# Patient Record
Sex: FEMALE | Race: WHITE | NOT HISPANIC OR LATINO
[De-identification: names, ages, dates, MRNs, and addresses within clinical notes are randomized per-mention and may not be internally consistent; named-entity substitution may affect disease eponyms.]

---

## 2021-02-05 ENCOUNTER — DASHBOARD ENCOUNTERS (OUTPATIENT)
Age: 61
End: 2021-02-05

## 2021-02-11 ENCOUNTER — OFFICE VISIT (OUTPATIENT)
Dept: URBAN - METROPOLITAN AREA CLINIC 40 | Facility: CLINIC | Age: 61
End: 2021-02-11

## 2022-09-13 ENCOUNTER — APPOINTMENT (RX ONLY)
Dept: URBAN - METROPOLITAN AREA OTHER 10 | Facility: OTHER | Age: 62
Setting detail: DERMATOLOGY
End: 2022-09-13

## 2022-09-13 DIAGNOSIS — I78.8 OTHER DISEASES OF CAPILLARIES: ICD-10-CM

## 2022-09-13 PROCEDURE — ? PRESCRIPTION MEDICATION MANAGEMENT

## 2022-09-13 PROCEDURE — ? COUNSELING

## 2022-09-13 PROCEDURE — 99202 OFFICE O/P NEW SF 15 MIN: CPT

## 2022-09-13 ASSESSMENT — LOCATION DETAILED DESCRIPTION DERM: LOCATION DETAILED: NASAL ROOT

## 2022-09-13 ASSESSMENT — LOCATION SIMPLE DESCRIPTION DERM: LOCATION SIMPLE: NOSE

## 2022-09-13 ASSESSMENT — LOCATION ZONE DERM: LOCATION ZONE: NOSE

## 2022-09-13 NOTE — HPI: SKIN LESION
How Severe Is Your Skin Lesion?: mild
Has Your Skin Lesion Been Treated?: not been treated
Is This A New Presentation, Or A Follow-Up?: Skin Lesion
Additional History: Pt states she had her eyebrows done and then left a pimple like bump and now it’s still there

## 2022-09-13 NOTE — PROCEDURE: PRESCRIPTION MEDICATION MANAGEMENT
Detail Level: Zone
Render In Strict Bullet Format?: No
Plan: Advised pt to see Alice Iglesias for laser treatment

## 2022-09-22 ENCOUNTER — APPOINTMENT (RX ONLY)
Dept: URBAN - METROPOLITAN AREA OTHER 7 | Facility: OTHER | Age: 62
Setting detail: DERMATOLOGY
End: 2022-09-22

## 2022-09-22 DIAGNOSIS — Z41.9 ENCOUNTER FOR PROCEDURE FOR PURPOSES OTHER THAN REMEDYING HEALTH STATE, UNSPECIFIED: ICD-10-CM

## 2022-09-22 DIAGNOSIS — I78.8 OTHER DISEASES OF CAPILLARIES: ICD-10-CM

## 2022-09-22 PROCEDURE — ? EXCEL V LASER

## 2022-09-22 PROCEDURE — ? COSMETIC CONSULTATION: INMODE MORPHEUS 8

## 2022-09-22 ASSESSMENT — LOCATION DETAILED DESCRIPTION DERM
LOCATION DETAILED: GLABELLA
LOCATION DETAILED: LEFT SUPERIOR LATERAL NECK
LOCATION DETAILED: LEFT SUPERIOR LATERAL BUCCAL CHEEK

## 2022-09-22 ASSESSMENT — LOCATION ZONE DERM
LOCATION ZONE: NECK
LOCATION ZONE: FACE

## 2022-09-22 ASSESSMENT — LOCATION SIMPLE DESCRIPTION DERM
LOCATION SIMPLE: LEFT ANTERIOR NECK
LOCATION SIMPLE: GLABELLA
LOCATION SIMPLE: LEFT CHEEK

## 2022-09-22 NOTE — PROCEDURE: EXCEL V LASER
Laser Type: KTP 532nm
Pre-Procedure Text: After consent was obtained and the procedure was explained, all persons present in the exam room put on their protective eyewear. Cold gel was applied to the treatment areas.
Procedural Text: The treatment areas were then treated as noted above.
Post-Procedure Text: Following the treatment, ice and broad spectrum sunscreen was applied to the treatment areas.  Post-care instructions were discussed.
Price (Use Numbers Only, No Special Characters Or $): 50
Fluence In J: 11
Pulse Width In Msec: 8
Temperature: 5 C
Treatment Number (Will Not Render If 0): 1
Detail Level: Detailed
Consent: Written consent obtained, risks reviewed including but not limited to crusting, scabbing, blistering, scarring, darker or lighter pigmentary change, and/or incomplete removal.
Post-Care Instructions: I reviewed with the patient in detail post-care instructions. Patient is to apply vaseline with a q-tip to all crusted areas, and avoid picking at any scabs. Pt should stay away from the sun and wear sun protection until fully healed.
External Cooling Fan Speed: 5
Spot Size: 3

## 2024-07-01 ENCOUNTER — APPOINTMENT (RX ONLY)
Dept: URBAN - METROPOLITAN AREA OTHER 7 | Facility: OTHER | Age: 64
Setting detail: DERMATOLOGY
End: 2024-07-01

## 2024-07-01 DIAGNOSIS — Z41.9 ENCOUNTER FOR PROCEDURE FOR PURPOSES OTHER THAN REMEDYING HEALTH STATE, UNSPECIFIED: ICD-10-CM

## 2024-07-01 PROCEDURE — ? CUTERA LASER

## 2024-07-01 ASSESSMENT — LOCATION ZONE DERM: LOCATION ZONE: NOSE

## 2024-07-01 ASSESSMENT — LOCATION SIMPLE DESCRIPTION DERM: LOCATION SIMPLE: NOSE

## 2024-07-01 ASSESSMENT — LOCATION DETAILED DESCRIPTION DERM: LOCATION DETAILED: NASAL ROOT

## 2024-07-01 NOTE — PROCEDURE: CUTERA LASER
Recent Sun Exposure?: No
Reactions To Today's Treatment (Free Text): There were no adverse reactions. The procedure was tolerated well.
Spot Size: 3 mm
Treatment Number: 1
Detail Level: Zone
Pre-Procedure: The treatment areas identified by the patient were cleansed and treatment was performed with the parameters mentioned above.
Skin Type: II
Anesthesia Type: 1% lidocaine with epinephrine
External Cooling Fan Speed: 5
Consent: Written consent obtained, risks reviewed including but not limited to crusting, scabbing, blistering, scarring, darker or lighter pigmentary change, bruising, and/or incomplete response.
Repetition Rate: 7 Hz
Post-Procedure: Following the procedure the post care instructions were reviewed with the patient.
Fluence: 145
Price $ (Use Numbers Only, No Text Please.): 150.00
Pulse Width: 0.3 ms
Post-Care Instructions: I reviewed with the patient in detail post-care instructions. Patient should stay away from the sun and wear sun protection until treated areas are fully healed.
Today's Treatment Results (Free Text): Target endpoints were achieved.